# Patient Record
Sex: FEMALE
[De-identification: names, ages, dates, MRNs, and addresses within clinical notes are randomized per-mention and may not be internally consistent; named-entity substitution may affect disease eponyms.]

---

## 2020-04-18 ENCOUNTER — HOSPITAL ENCOUNTER (EMERGENCY)
Dept: HOSPITAL 56 - MW.ED | Age: 30
Discharge: HOME | End: 2020-04-18
Payer: SELF-PAY

## 2020-04-18 DIAGNOSIS — S39.012A: Primary | ICD-10-CM
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
## 2020-04-18 PROCEDURE — 99283 EMERGENCY DEPT VISIT LOW MDM: CPT

## 2020-04-18 PROCEDURE — 96372 THER/PROPH/DIAG INJ SC/IM: CPT

## 2020-04-18 NOTE — EDM.PDOC
ED HPI GENERAL MEDICAL PROBLEM





- General


Stated Complaint: BACK PAIN


Time Seen by Provider: 04/18/20 11:14


Source of Information: Reports: Patient


History Limitations: Reports: No Limitations





- History of Present Illness


INITIAL COMMENTS - FREE TEXT/NARRATIVE: 





29-year-old female with history of factor V Leiden on Coumadin presents with 

atraumatic right lower back pain for 2 days.  Pain is mild, constant, 

exacerbated by movement and palpation, nonradiating.  She denies fever, chills, 

IVDA, diabetes, recent spine surgery, urinary or fecal incontinence.  She has 

been taking Tylenol with no relief.





ROS: A 10-point review of systems, other than pertinent positives and negatives 

as stated per HPI, is otherwise negative


_______________________________________________________________________________


PHYSICAL EXAM





General: AOx4, GCS = 15, No distress


HEENT: dry mucous membrane


Neck: supple, no meningismus, no Kernig or Brudzinski


Cardiac: S1S2 RRR


Respiratory: CTAB, no crackles or rales, no wheezing


Abdomen: Soft, nontender, no rebound or guarding, nondistended, no pulsatile 

mass.


Back: nontender to C/T/L spine midline. ttp right lumbar paraspinal with spasm. 


Musculoskeletal: NVI distally, no deformity


Neuro: No focal deficits.


_______________________________________________________________________________


MEDICAL DECISION MAKING: I reviewed the patients past medical records, lab and 

radiographic findings. I discussed the case with family members. My 

differential diagnosis included: low back strain. Patient's back pain is 

suggestive of musculoskeletal strain. There are no complaints of urinary or 

fecal incontinence, focal numbness or weakness. The patient has a normal gait 

in the ER. There is no evidence of fever, IV drug use, recent back surgery, or 

immunocompromised state. I do not suspect caude equine syndrome or cord 

compression which would warrant further imaging. 














- Related Data


Home Meds: 


 Home Meds





Cyclobenzaprine [Flexeril] 10 mg PO BID #10 tab 04/18/20 [Rx]











ED ROS GENERAL





- Review of Systems


Review Of Systems: See Below (See dictation)





ED EXAM,LOWER BACK PAIN/INJURY





- Physical Exam


Exam: See Below (See dictation)





Course





- Orders/Labs/Meds


Meds: 


Medications














Discontinued Medications














Generic Name Dose Route Start Last Admin





  Trade Name Ryley  PRN Reason Stop Dose Admin


 


Orphenadrine Citrate  60 mg  04/18/20 11:24  





  Norflex  IM  04/18/20 11:25  





  ONETIME ONE   





     





     





     





     














- Re-Assessments/Exams


Free Text/Narrative Re-Assessment/Exam: 





04/18/20 11:27 -after IM norflex, she improved clinically and is stable for 

discharge. I performed a repeat examination and the patient has not 

demonstrated any new abnormal findings. Patient exhibits normal vital signs and 

has exhibited a normal gait. I advised the patient to return to the ER for 

reevaluation if symptoms worsened, and to follow up with their PCP within 2-3 

days.














Departure





- Departure


Time of Disposition: 11:23


Disposition: Home, Self-Care 01


Condition: Good


Clinical Impression: 


 Low back strain








- Discharge Information


Prescriptions: 


Cyclobenzaprine [Flexeril] 10 mg PO BID #10 tab


Instructions:  Muscle Strain, Easy-to-Read


Referrals: 


PCP,None [Primary Care Provider] - 


Additional Instructions: 


The following information is given to patients seen in the emergency department 

who are being discharged to home. This information is to outline your options 

for follow-up care. We provide all patients seen in our emergency department 

with a follow-up referral.





The need for follow-up, as well as the timing and circumstances, are variable 

depending upon the specifics of your emergency department visit.





If you don't have a primary care physician on staff, we will provide you with a 

referral. We always advise you to contact your personal physician following an 

emergency department visit to inform them of the circumstance of the visit and 

for follow-up with them and/or the need for any referrals to a consulting 

specialist.





The emergency department will also refer you to a specialist when appropriate. 

This referral assures that you have the opportunity for follow-up care with a 

specialist. All of these measure are taken in an effort to provide you with 

optimal care, which includes your follow-up.





Under all circumstances we always encourage you to contact your private 

physician who remains a resource for coordinating your care. When calling for 

follow-up care, please make the office aware that this follow-up is from your 

recent emergency room visit. If for any reason you are refused follow-up, 

please contact the [facility] Emergency 

Department at [phone] and asked to speak to the emergency department 

charge nurse.





[facility]


[address] [place] [id number]


Phone: [phone]


Fax: [phone]





[address] [address]


Phone: [phone]


Fax: [phone]











Sepsis Event Note





- Focused Exam


Date Exam was Performed: 04/18/20


Time Exam was Performed: 11:29

## 2021-05-18 ENCOUNTER — HOSPITAL ENCOUNTER (OUTPATIENT)
Dept: HOSPITAL 56 - MW.SDS | Age: 31
Discharge: HOME | End: 2021-05-18
Attending: OBSTETRICS & GYNECOLOGY
Payer: COMMERCIAL

## 2021-05-18 DIAGNOSIS — Z01.812: ICD-10-CM

## 2021-05-18 DIAGNOSIS — N80.3: Primary | ICD-10-CM

## 2021-05-18 DIAGNOSIS — R32: ICD-10-CM

## 2021-05-18 DIAGNOSIS — Z20.822: ICD-10-CM

## 2021-05-18 DIAGNOSIS — E66.01: ICD-10-CM

## 2021-05-18 DIAGNOSIS — Z88.8: ICD-10-CM

## 2021-05-18 DIAGNOSIS — E72.12: ICD-10-CM

## 2021-05-18 DIAGNOSIS — D68.2: ICD-10-CM

## 2021-05-18 DIAGNOSIS — Z79.01: ICD-10-CM

## 2021-05-18 DIAGNOSIS — N73.6: ICD-10-CM

## 2021-05-18 PROCEDURE — 85610 PROTHROMBIN TIME: CPT

## 2021-05-18 PROCEDURE — 86901 BLOOD TYPING SEROLOGIC RH(D): CPT

## 2021-05-18 PROCEDURE — U0002 COVID-19 LAB TEST NON-CDC: HCPCS

## 2021-05-18 PROCEDURE — 36415 COLL VENOUS BLD VENIPUNCTURE: CPT

## 2021-05-18 PROCEDURE — 58662 LAPAROSCOPY EXCISE LESIONS: CPT

## 2021-05-18 PROCEDURE — 86900 BLOOD TYPING SEROLOGIC ABO: CPT

## 2021-05-18 PROCEDURE — 87635 SARS-COV-2 COVID-19 AMP PRB: CPT

## 2021-05-18 PROCEDURE — 86850 RBC ANTIBODY SCREEN: CPT

## 2021-05-18 PROCEDURE — 84703 CHORIONIC GONADOTROPIN ASSAY: CPT

## 2021-05-18 PROCEDURE — 85027 COMPLETE CBC AUTOMATED: CPT

## 2021-05-18 PROCEDURE — 88305 TISSUE EXAM BY PATHOLOGIST: CPT

## 2021-05-18 NOTE — PCM48HPAN
Post Anesthesia Note





- EVALUATION WITHIN 48HRS OF ANESTHETIC


Vital Signs in Normal Range: Yes


Patient Participated in Evaluation: Yes


Respiratory Function Stable: Yes


Airway Patent: Yes


Cardiovascular Function Stable: Yes


Hydration Status Stable: Yes


Pain Control Satisfactory: Yes


Nausea and Vomiting Control Satisfactory: Yes


Mental Status Recovered: Yes


Vital Signs: 


                                Last Vital Signs











Temp  97.9 F   05/18/21 10:37


 


Pulse  93   05/18/21 11:08


 


Resp  12   05/18/21 11:08


 


BP  106/66   05/18/21 11:08


 


Pulse Ox  96   05/18/21 11:08

## 2021-05-18 NOTE — PCM.OPNOTE
- General Post-Op/Procedure Note


Date of Surgery/Procedure: 05/18/21


Operative Procedure(s): Operative Laparoscopy.  Peritoneal biospy.  Ablation of 

endometriotic deposits


Findings: 


Normal sized anteverted uterus


SOme area of whitish appearing tiny lesion on serous of uterus


Left ovarian fossa with endometriotic nodule - biopsy


Very scant reddish endometriotic deposits noted in the right ovarian fossa 


Some scarring around the uterosacral ligament 





Pre Op Diagnosis: Endometrosis.  Chronic pelvic pain


Post-Op Diagnosis: same


Primary Surgeon: Enoc Young


Secondary Surgeon: Felicia Grijalva


Anesthesia Provider: Filiep De La Rosa


Pathology: 


left peritoneal biopsy 


Fluid Replacement, Intraop: 1,500


EBL in mLs: 30


Complications: None


Condition: Good


Free Text/Narrative:: 


                                 Intake & Output











 05/17/21 05/18/21 05/18/21





 22:59 06:59 14:59


 


Intake Total   1500


 


Output Total   50


 


Balance   1450

## 2021-05-18 NOTE — PCM.POSTAN
POST ANESTHESIA ASSESSMENT





- MENTAL STATUS


Mental Status: Alert (no anesthetic problems), Oriented





- VITAL SIGNS


Vital Signs: 


                                Last Vital Signs











Temp  97.9 F   05/18/21 10:37


 


Pulse  93   05/18/21 11:08


 


Resp  12   05/18/21 11:08


 


BP  106/66   05/18/21 11:08


 


Pulse Ox  96   05/18/21 11:08














- RESPIRATORY


Respiratory Status: Respiratory Rate WNL, Airway Patent, O2 Saturation Stable





- CARDIOVASCULAR


CV Status: Pulse Rate WNL, Blood Pressure Stable





- GASTROINTESTINAL


GI Status: No Symptoms





- POST OP HYDRATION


Hydration Status: Adequate & Stable

## 2021-05-18 NOTE — PCM.PREANE
Preanesthetic Assessment





- Anesthesia/Transfusion/Family Hx


Anesthesia History: Prior Anesthesia Without Reaction


Family History of Anesthesia Reaction: No


Transfusion History: No Prior Transfusion(s)





- Review of Systems


General: No Symptoms


Pulmonary: No Symptoms


Cardiovascular: No Symptoms


Gastrointestinal: No Symptoms


Neurological: No Symptoms


Other: Reports: None





- Physical Assessment


NPO Status Date: 05/18/21


NPO Status Time: 00:01


Height: 5 ft 5 in


Weight: 280 lb


ASA Class: 3


Mental Status: Alert & Oriented x3


Airway Class: Mallampati = 2


Dentition: Reports: Normal Dentition


ROM/Head Extension: Full


Lungs: Clear to Auscultation, Normal Respiratory Effort


Cardiovascular: Regular Rate, Regular Rhythm





- Lab


Values: 





                             Laboratory Last Values











WBC  7.22 K/uL (4.0-11.0)   05/18/21  06:24    


 


RBC  4.54 M/uL (4.30-5.90)   05/18/21  06:24    


 


Hgb  14.4 g/dL (12.0-16.0)   05/18/21  06:24    


 


Hct  42.0 % (36.0-46.0)   05/18/21  06:24    


 


MCV  92.5 fL (80.0-98.0)   05/18/21  06:24    


 


MCH  31.7 pg (27.0-32.0)   05/18/21  06:24    


 


MCHC  34.3 g/dL (31.0-37.0)   05/18/21  06:24    


 


RDW Std Deviation  47.5 fl (28.0-62.0)   05/18/21  06:24    


 


RDW Coeff of Emir  14 % (11.0-15.0)   05/18/21  06:24    


 


Plt Count  200 K/uL (150-400)   05/18/21  06:24    


 


MPV  10.60 fL (7.40-12.00)   05/18/21  06:24    


 


Nucleated RBC %  0.0 /100WBC  05/18/21  06:24    


 


Nucleated RBCs #  0 K/uL  05/18/21  06:24    


 


INR  1.12   05/18/21  06:24    


 


HCG, Qual  NEGATIVE  (NEG)   05/18/21  06:24    


 


SARS-CoV-2 RNA (WANDA)  NEGATIVE  (NEGATIVE)   05/18/21  06:05    














- Allergies


Allergies/Adverse Reactions: 


                                    Allergies











Allergy/AdvReac Type Severity Reaction Status Date / Time


 


meperidine [From Demerol] Allergy  "makes me Verified 05/18/21 07:08





   go crazy"  














- Anesthesia Plan


Pre-Op Medication Ordered: None





- Acknowledgements


Anesthesia Type Planned: General Anesthesia


Pt an Appropriate Candidate for the Planned Anesthesia: Yes


Alternatives and Risks of Anesthesia Discussed w Pt/Guardian: Yes


Pt/Guardian Understands and Agrees with Anesthesia Plan: Yes


Additional Comments: 





npo after mn


known family hx Factor V Leiden


PE 2011 on coumadin since then  last dose Friday


no cv problems


morbid obesity


tob  none


etoh  occ


par no questions








PreAnesthesia Questionnaire


HEENT History: Reports: Other (See Below)


Other HEENT History: wears glasses


Cardiovascular History: Reports: Blood Clots/VTE/DVT


Other Cardiovascular History: DVT x4


Respiratory History: Reports: PE


Other Respiratory History: PE in 2011


Gastrointestinal History: Reports: Cholelithiasis, Other (See Below)


Other Gastrointestinal History: occasional heartburn


Genitourinary History: Reports: UTI, Recurrent


OB/GYN History: Reports: Endometriosis, Pregnancy


Musculoskeletal History: Reports: Fibromyalgia


Neurological History: Reports: None


Psychiatric History: Reports: None


Endocrine/Metabolic History: Reports: Obesity/BMI 30+


Hematologic History: Reports: Other (See Below)


Other Hematologic History: factor V


Immunologic History: Reports: None


Oncologic (Cancer) History: Reports: None


Dermatologic History: Reports: None





- Infectious Disease History


Infectious Disease History: Reports: Chicken Pox





- Past Surgical History


Head Surgeries/Procedures: Reports: None


HEENT Surgical History: Reports: Adenoidectomy, Tonsillectomy


Cardiovascular Surgical History: Reports: None


Respiratory Surgical History: Reports: None


GI Surgical History: Reports: Cholecystectomy


Female  Surgical History: Reports: Tubal Ligation, Other (See Below)


Other Female  Surgeries/Procedures: diagnostic laparoscopy


Endocrine Surgical History: Reports: None


Neurological Surgical History: Reports: None


Musculoskeletal Surgical History: Reports: None


Oncologic Surgical History: Reports: None


Dermatological Surgical History: Reports: None





- SUBSTANCE USE


Tobacco Use Status *Q: Never Tobacco User





- HOME MEDS


Home Medications: 


                                    Home Meds





Warfarin [Coumadin] 12.5 mg PO BEDTIME 04/18/20 [History]











- CURRENT (IN HOUSE) MEDS


Current Meds: 





                               Current Medications





Lactated Ringer's (Ringers, Lactated)  1,000 mls @ 500 mls/hr IV BOLUS CYNTHIA


Sodium Chloride (Sodium Chloride 0.9% 10 Ml Syringe)  10 ml FLUSH ASDIRECTED PRN


   PRN Reason: Keep Vein Open


Sodium Chloride (Sodium Chloride 0.9% 2.5 Ml Syringe)  2.5 ml FLUSH ASDIRECTED 

PRN


   PRN Reason: Keep Vein Open


Sodium Chloride (Sodium Chloride 0.9% 10 Ml Sdv)  10 ml IV ASDIRECTED PRN


   PRN Reason: IV Use

## 2021-05-19 NOTE — OR
SURGEON:

ANAYELI JOSEPH

 

DATE OF PROCEDURE:  05/18/2021

 

PREOPERATIVE DIAGNOSIS:

A 30-year-old para 2 with chronic pelvic pain secondary to endometriosis.

MTHFR

Factor V leiden def 

 

POSTOPERATIVE DIAGNOSIS:

A 30-year-old para 2 with chronic pelvic pain secondary to endometriosis.

MTHFR , Factor V leiden def 

 

PROCEDURE:

Operative laparoscopy with biopsy of the endometrial lesion and ablation of the

endometriosis deposits. Lysis of adhesion 

 

ANESTHESIA:

General endotracheal tube.

 

COMPLICATION:

None.

 

INTRAVENOUS FLUID:

1500.

 

ESTIMATED BLOOD LOSS:

30 mL.

 

NOTES AND FINDINGS:

A normal-sized anteverted uterus.  Laparoscopy showed uterus with some white

fibrous patches around the uterus. There was very scant endometriotic deposits

in the posterior cul-de-sac, around the ovarian fossa on the right and the left.

There was about a small 5 mm endometriotic deposit in the left ovarian

fossa.  There was also lysis of adhesions done between of the colon on the left

with the pelvic sidewall.

 

BRIEF HISTORY ABOUT THE PATIENT:

She is a 30-year-old, para 2, who was complaining of chronic pelvic pain.  She

had a history of laparoscopic diagnosed endometriosis at age 18.  She was having

worsening pain over the couple of years. She cannot be on hormone suppression

because of history of pulmonary embolism.  She has a factor V Leiden deficiency

and also MTHFR. Prior to surgery, she was cleared by primary care doctor.  She

withheld warfarin 3 days before the procedure.  INR before procedure was 1.15.

The patient was explained the risks, benefits, alternatives, and she decided to

proceed with the procedure.

 

DESCRIPTION OF PROCEDURE:

The patient was taken to the operating room where general anesthesia was

performed without difficulty.  She was prepared and draped in the dorsal

lithotomy position with Wenceslao stirrups.  The cervix was properly prepped, and

the speculum was placed to expose the cervix. Anterior lips were grasped with

the Allis clamp and was dilated to accommodate the manipulator.  Attention was

then placed to the abdomen.  Marcaine was injected below the umbilicus around

the umbilicus fold.  Then about 2 mL of Marcaine was injected.  Then, a 5mm 
incision

was made. The fiberoptic trocar was then placed with the laparoscope, and there 
was difficulty in entry of the abdomen, and a

Veress needle was also attempted with no confirmation of intraperitoneal

placement.  Hence, there a decision made to proceed with open laparoscopy, so

about a 1-cm incision was made at the umbilical fold.  With careful dissection,

blunt and sharp, the fascia was grasped with a Kocher clamp, and the peritoneum

was also grasped with the kocher , and the abdomen was entered. The Bharathi 
trocar

was then placed in without difficulty and was secured with 2.0 Vicryl 

on both sides.  The pneumoperitoneum was obtained to 15 mmHg.  

Two 5mm trocar placed at the right and left lower quadrant. 

The patient was placed in Trendelenburg position.  The bowel was taken out of 
the operating

field.  There was colonic adhesion to the left pelvic sidewall, which was

relieved by careful dissection with the laparoscopic scissors.  Then, better

visualization of the left pelvic sidewall was noted. Endometriotic deposit was

noted on the left pelvic sidewall.  A little incision was done on the pelvis and
on

the peritoneum, and there was hydrodistention beneath the deposit and the

peritoneum was then removed.  The endometriotic deposit was removed with the aid

of the laparoscopic scissors.  Hemostasis was noted.  The endometriotic deposit

was far away from the ureter and of the movement within ureter was noted.  On 
the

right pelvic sidewall, some endometriotic deposits were noted, which was then

ablated with the rollerball. Hemostasis was noted.  The pelvis was inspected.

There was no bleeding that was noted.  Then, the trocars were removed and

pneumoperitoneum was emptied. The 12 mm port incision was then closed, the

fascia was closed with 0 Vicryl on the UR-6 needle, and the skin was closed with

3-0 Monocryl.  Both the right and left laparoscopic incision sites were also

closed.  The uterine manipulator was removed.  The cervix was inspected, noted

to be hemostatic.  All instrument and pad counts were correct x2.  The patient

tolerated the procedure well and will follow up in clinic in 2 weeks.

 

 

MARVA PENNY

DD:  05/18/2021 17:14:29

DT:  05/18/2021 22:48:25

Job #:  [id number]

EDDIE

## 2025-07-07 ENCOUNTER — HOSPITAL ENCOUNTER (EMERGENCY)
Dept: HOSPITAL 56 - MW.ED | Age: 35
Discharge: HOME | End: 2025-07-07
Payer: COMMERCIAL

## 2025-07-07 DIAGNOSIS — Z88.8: ICD-10-CM

## 2025-07-07 DIAGNOSIS — Z79.899: ICD-10-CM

## 2025-07-07 DIAGNOSIS — R07.89: Primary | ICD-10-CM

## 2025-07-07 DIAGNOSIS — Z75.3: ICD-10-CM

## 2025-07-07 DIAGNOSIS — Z90.49: ICD-10-CM

## 2025-07-07 DIAGNOSIS — E66.9: ICD-10-CM

## 2025-07-07 LAB
ALBUMIN SERPL-MCNC: 3.6 G/DL (ref 3.4–5)
ALBUMIN/GLOB SERPL: 1.1 {RATIO} (ref 0.9–1.6)
ALP SERPL-CCNC: 91 U/L (ref 46–116)
ALT SERPL-CCNC: 34 IU/L (ref 14–63)
AST SERPL-CCNC: 19 IU/L (ref 15–37)
BASOPHILS # BLD AUTO: 0.02 K/UL (ref 0–0.2)
BASOPHILS NFR BLD AUTO: 0.3 % (ref 0–1)
BILIRUB SERPL-MCNC: 1.1 MG/DL (ref 0.2–1)
BUN SERPL-MCNC: 15 MG/DL (ref 7–18)
CALCIUM SERPL-MCNC: 8.9 MG/DL (ref 8.5–10.1)
CHLORIDE SERPL-SCNC: 104 MMOL/L (ref 98–107)
CO2 SERPL-SCNC: 25.3 MMOL/L (ref 21–32)
CREAT CL 24H UR+SERPL-VRATE: (no result) ML/MIN
CREAT SERPL-MCNC: 0.8 MG/DL (ref 0.6–1)
EGFRCR SERPLBLD CKD-EPI 2021: 100 ML/MIN (ref 60–?)
EOSINOPHIL # BLD AUTO: 0.13 K/UL (ref 0–0.45)
EOSINOPHIL NFR BLD AUTO: 2.1 % (ref 0–6)
GLOBULIN SER-MCNC: 3.4 G/DL (ref 2.6–4)
GLUCOSE SERPL-MCNC: 98 MG/DL (ref 74–106)
HCT VFR BLD AUTO: 40.8 % (ref 37–47)
HGB BLD-MCNC: 14 G/DL (ref 12–16)
IMM GRANULOCYTES # BLD: 0.01 K/UL (ref 0–0.05)
IMM GRANULOCYTES NFR BLD: 0.2 % (ref 0–0.4)
LYMPHOCYTES # BLD AUTO: 1.51 K/UL (ref 1–4.8)
LYMPHOCYTES NFR BLD AUTO: 24.9 % (ref 24–44)
MCH RBC QN AUTO: 30.3 PG (ref 28–32)
MCHC RBC AUTO-ENTMCNC: 34.3 G/DL (ref 32–36)
MCHC RBC AUTO-ENTMCNC: 88.3 FL (ref 83–99)
MONOCYTES # BLD AUTO: 0.46 K/UL (ref 0–0.8)
MONOCYTES NFR BLD AUTO: 7.6 % (ref 0–8)
NEUTROPHILS # BLD AUTO: 3.94 K/UL (ref 1.8–7.7)
NEUTROPHILS NFR BLD AUTO: 64.9 % (ref 41–71)
NRBC BLD AUTO-RTO: 0 /100WBC (ref 0–0.2)
NRBC BLD AUTO-RTO: 0 K/UL (ref 0–0.02)
PLATELET # BLD AUTO: 191 K/UL (ref 150–400)
PMV BLD AUTO: 10 FL (ref 9.4–12.3)
POTASSIUM SERPL-SCNC: 4 MMOL/L (ref 3.5–5.1)
PROT SERPL-MCNC: 7 G/DL (ref 6.4–8.2)
RBC # BLD AUTO: 4.62 M/UL (ref 4.1–5.3)
SODIUM SERPL-SCNC: 139 MMOL/L (ref 136–145)
WBC # BLD AUTO: 6.07 K/UL (ref 3.9–11.3)

## 2025-07-07 PROCEDURE — 80053 COMPREHEN METABOLIC PANEL: CPT

## 2025-07-07 PROCEDURE — 36415 COLL VENOUS BLD VENIPUNCTURE: CPT

## 2025-07-07 PROCEDURE — 84484 ASSAY OF TROPONIN QUANT: CPT

## 2025-07-07 PROCEDURE — 85379 FIBRIN DEGRADATION QUANT: CPT

## 2025-07-07 PROCEDURE — 85025 COMPLETE CBC W/AUTO DIFF WBC: CPT

## 2025-07-07 PROCEDURE — 93005 ELECTROCARDIOGRAM TRACING: CPT

## 2025-07-07 PROCEDURE — 99285 EMERGENCY DEPT VISIT HI MDM: CPT

## 2025-07-07 PROCEDURE — 71275 CT ANGIOGRAPHY CHEST: CPT

## 2025-07-07 RX ADMIN — IOPAMIDOL STA ML: 755 INJECTION, SOLUTION INTRAVENOUS at 09:12
